# Patient Record
(demographics unavailable — no encounter records)

---

## 2024-10-10 NOTE — IMAGING
[de-identified] : LSPINE Inspection: No rash or ecchymosis Palpation: No tenderness to palpation or spasm in bilateral thoracic and lumbar paraspinal musculature, no SI joint tenderness to palpation ROM: Full with stiffness Strength: 5/5 bilateral hip flexors, knee extensors, ankle dorsiflexors, EHL, ankle plantarflexors Sensation: Sensation present to light touch bilateral L2-S1 distributions Provocative maneuvers: Negative bilateral straight leg raise [No bony abnormalities] : No bony abnormalities [AP] : anteroposterior [There are no fractures, subluxations or dislocations. No significant abnormalities are seen] : There are no fractures, subluxations or dislocations. No significant abnormalities are seen

## 2024-10-10 NOTE — ASSESSMENT
[FreeTextEntry1] : PT, meds  Will discuss MRI  NSAIDs- Patient warned of risk of medication to GI tract, increased blood pressure, cardiac risk, and risk of fluid retention.  Advised to clear medication with internist or PCP if any concurrent health problem with heart, blood pressure, or GI system exists.

## 2024-10-10 NOTE — HISTORY OF PRESENT ILLNESS
[Sharp] : sharp [Intermittent] : intermittent [Meds] : meds [Bending forward] : bending forward [Full time] : Work status: full time [de-identified] : 10/10/2024: Patient is a 30 year y.o. male presenting for evaluation of lower ack/. pt states that he has been having low back pain over a year. recently has increased. denies radiating pain. pt states that he has occasional numbness and tingling in his legs. no prior treatment.  he notes some subjective weakness. No bb dysfunction.    ?smoker, on blood thinner, or diabetic: none  [] : no [FreeTextEntry1] : lower back  [de-identified] : kelly

## 2024-12-10 NOTE — IMAGING
[No bony abnormalities] : No bony abnormalities [AP] : anteroposterior [There are no fractures, subluxations or dislocations. No significant abnormalities are seen] : There are no fractures, subluxations or dislocations. No significant abnormalities are seen [de-identified] : LSPINE Palpation: No tenderness to palpation or spasm in bilateral thoracic and lumbar paraspinal musculature, no SI joint tenderness to palpation ROM: Full with stiffness Strength: 5/5 bilateral hip flexors, knee extensors, ankle dorsiflexors, EHL, ankle plantarflexors Sensation: Sensation present to light touch bilateral L2-S1 distributions Provocative maneuvers: Negative bilateral straight leg raise

## 2024-12-10 NOTE — HISTORY OF PRESENT ILLNESS
[Sharp] : sharp [Intermittent] : intermittent [Meds] : meds [Bending forward] : bending forward [Full time] : Work status: full time [de-identified] : 10/10/2024: Patient is a 30 year y.o. male presenting for evaluation of lower back. pt states that he has been having low back pain over a year. recently has increased. denies radiating pain. pt states that he has occasional numbness and tingling in his legs. no prior treatment.  he notes some subjective weakness. No bb dysfunction.  12/10/24: Pt has been going to PT, states mild improvement. Not taking pain meds.  smoker, on blood thinner, or diabetic: none  [] : no [FreeTextEntry1] : lower back  [de-identified] : kelly

## 2024-12-10 NOTE — ASSESSMENT
[FreeTextEntry1] : Patient has failed 6 weeks of conservative care, including physical therapy and medications. Will obtain MRI to rule out HNP; will also be used to guide potential future injections/surgical management.  NSAIDs- Patient warned of risk of medication to GI tract, increased blood pressure, cardiac risk, and risk of fluid retention.  Advised to clear medication with internist or PCP if any concurrent health problem with heart, blood pressure, or GI system exists.

## 2024-12-20 NOTE — IMAGING
[No bony abnormalities] : No bony abnormalities [AP] : anteroposterior [There are no fractures, subluxations or dislocations. No significant abnormalities are seen] : There are no fractures, subluxations or dislocations. No significant abnormalities are seen [de-identified] : LSPINE Palpation: No tenderness to palpation or spasm in bilateral thoracic and lumbar paraspinal musculature, no SI joint tenderness to palpation ROM: Full with stiffness Strength: 5/5 bilateral hip flexors, knee extensors, ankle dorsiflexors, EHL, ankle plantarflexors Sensation: Sensation present to light touch bilateral L2-S1 distributions Provocative maneuvers: Negative bilateral straight leg raise

## 2024-12-20 NOTE — HISTORY OF PRESENT ILLNESS
[Sharp] : sharp [Intermittent] : intermittent [Meds] : meds [Bending forward] : bending forward [Full time] : Work status: full time [de-identified] : 12/12/2024 Lumbar MRI  - report noted in chart.   Ind. review- LR narrowing L4/5, L5/S1 less so ================================================================================= 10/10/2024: Patient is a 30 year y.o. male presenting for evaluation of lower back. pt states that he has been having low back pain over a year. recently has increased. denies radiating pain. pt states that he has occasional numbness and tingling in his legs. no prior treatment.  he notes some subjective weakness. No bb dysfunction.  12/10/24: Pt has been going to PT, states mild improvement. Not taking pain meds. 12/20/24: Pt here for MRI results.  smoker, on blood thinner, or diabetic: none  [] : no [FreeTextEntry1] : lower back  [de-identified] : kelly

## 2024-12-20 NOTE — ASSESSMENT
[FreeTextEntry1] : LR narrowing L4/5, L5/S1 less so Discussed Pain and surgery options  NSAIDs- Patient warned of risk of medication to GI tract, increased blood pressure, cardiac risk, and risk of fluid retention.  Advised to clear medication with internist or PCP if any concurrent health problem with heart, blood pressure, or GI system exists.

## 2025-05-20 NOTE — HISTORY OF PRESENT ILLNESS
[Sharp] : sharp [Intermittent] : intermittent [Meds] : meds [Bending forward] : bending forward [Full time] : Work status: full time [de-identified] : 12/12/2024 Lumbar MRI  - report noted in chart.  Ind. review- LR narrowing L4/5, L5/S1 less so ================================================================================= 10/10/2024: Patient is a 30 year y.o. male presenting for evaluation of lower back. pt states that he has been having low back pain over a year. recently has increased. denies radiating pain. pt states that he has occasional numbness and tingling in his legs. no prior treatment.  he notes some subjective weakness. No bb dysfunction.  12/10/24: Pt has been going to PT, states mild improvement. Not taking pain meds. 12/20/24: Pt here for MRI results. 5/20/25- sxs same. He is attending PT smoker, on blood thinner, or diabetic: none  [] : no [FreeTextEntry1] : lower back  [de-identified] : kelly

## 2025-05-20 NOTE — IMAGING
[No bony abnormalities] : No bony abnormalities [AP] : anteroposterior [There are no fractures, subluxations or dislocations. No significant abnormalities are seen] : There are no fractures, subluxations or dislocations. No significant abnormalities are seen [de-identified] : LSPINE Palpation: No tenderness to palpation or spasm in bilateral thoracic and lumbar paraspinal musculature, no SI joint tenderness to palpation ROM: Full with stiffness Strength: 5/5 bilateral hip flexors, knee extensors, ankle dorsiflexors, EHL, ankle plantarflexors Sensation: Sensation present to light touch bilateral L2-S1 distributions Provocative maneuvers: Negative bilateral straight leg raise

## 2025-05-20 NOTE — ASSESSMENT
[FreeTextEntry1] : LR narrowing L4/5, L5/S1 less so Pain c/s  NSAIDs- Patient warned of risk of medication to GI tract, increased blood pressure, cardiac risk, and risk of fluid retention.  Advised to clear medication with internist or PCP if any concurrent health problem with heart, blood pressure, or GI system exists.

## 2025-05-23 NOTE — PHYSICAL EXAM
[de-identified] : Constitutional; Appears well, no apparent distress Ability to communicate: Normal  Respiratory: non-labored breathing Skin: No rash noted Head: Normocephalic, atraumatic Neck: no visible thyroid enlargement Eyes: Extraocular movements intact Neurologic: Alert and oriented x3 Psychiatric: normal mood, affect and behavior [Flexion] : flexion [] : motor exam is 5/5 throughout both lower extremities with normal tone

## 2025-05-23 NOTE — PHYSICAL EXAM
[de-identified] : Constitutional; Appears well, no apparent distress Ability to communicate: Normal  Respiratory: non-labored breathing Skin: No rash noted Head: Normocephalic, atraumatic Neck: no visible thyroid enlargement Eyes: Extraocular movements intact Neurologic: Alert and oriented x3 Psychiatric: normal mood, affect and behavior [Flexion] : flexion [] : motor exam is 5/5 throughout both lower extremities with normal tone

## 2025-05-23 NOTE — DISCUSSION/SUMMARY
[de-identified] : After discussing various treatment options with the patient including but not limited to oral medications, physical therapy, exercise modalities as well as interventional spinal injections, we have decided with the following plan:   - Continue Home exercises, stretching, activity modification, physical therapy, and conservative care. - MRI report and/or images was reviewed and discussed with the patient. - Recommend L4-5 Lumbar Epidural Steroid Injection under fluoroscopic guidance with image. - The risks, benefits and alternatives of the proposed procedure were explained in detail with the patient. The risks outlined include but are not limited to infection, bleeding, post-dural puncture headache, nerve injury, a temporary increase in pain, failure to resolve symptoms, allergic reaction, symptom recurrence, and possible elevation of blood sugar in diabetics. All questions were answered to patient's apparent satisfaction and he/she verbalized an understanding. - Patient is presenting with acute/sub-acute radicular pain with impairment in ADLs and functionality.  The pain has not responded to conservative care including NSAID therapy and/or physical therapy.  There is no bleeding tendency, unstable medical condition, or systemic infection. - Follow up in 1-2 weeks post injection for re-evaluation.

## 2025-05-23 NOTE — DISCUSSION/SUMMARY
[de-identified] : After discussing various treatment options with the patient including but not limited to oral medications, physical therapy, exercise modalities as well as interventional spinal injections, we have decided with the following plan:   - Continue Home exercises, stretching, activity modification, physical therapy, and conservative care. - MRI report and/or images was reviewed and discussed with the patient. - Recommend L4-5 Lumbar Epidural Steroid Injection under fluoroscopic guidance with image. - The risks, benefits and alternatives of the proposed procedure were explained in detail with the patient. The risks outlined include but are not limited to infection, bleeding, post-dural puncture headache, nerve injury, a temporary increase in pain, failure to resolve symptoms, allergic reaction, symptom recurrence, and possible elevation of blood sugar in diabetics. All questions were answered to patient's apparent satisfaction and he/she verbalized an understanding. - Patient is presenting with acute/sub-acute radicular pain with impairment in ADLs and functionality.  The pain has not responded to conservative care including NSAID therapy and/or physical therapy.  There is no bleeding tendency, unstable medical condition, or systemic infection. - Follow up in 1-2 weeks post injection for re-evaluation.

## 2025-05-23 NOTE — HISTORY OF PRESENT ILLNESS
[Lower back] : lower back [Left Leg] : left leg [Right Leg] : right leg [9] : 9 [0] : 0 [Dull/Aching] : dull/aching [Radiating] : radiating [Sharp] : sharp [Throbbing] : throbbing [Tightness] : tightness [Tingling] : tingling [Intermittent] : intermittent [Household chores] : household chores [Leisure] : leisure [Sitting] : sitting [Lying in bed] : lying in bed [Physical therapy] : physical therapy [FreeTextEntry1] : Initial HPI 05/23/2025: Pain started over a year ago and is across the lower back described as a sharp pain with numbness and tingling in his feet. Pain is worse with bending forward. Saw Dr. Tierney who recommended pain mgt.   MRI Lumbar Spine 12/12/24 independently reviewed: L4-5 LR stenosis  Conservative Care: has done PT with no relief  Pain Medications: Mobic PRN  Past Injections: none Spine surgery: none  Blood thinners: none [] : no [FreeTextEntry6] : numbness and tingling in bilateral legs  [FreeTextEntry7] : Bilateral legs  [de-identified] : x-ray and MRI

## 2025-05-23 NOTE — HISTORY OF PRESENT ILLNESS
[Lower back] : lower back [Left Leg] : left leg [Right Leg] : right leg [9] : 9 [0] : 0 [Dull/Aching] : dull/aching [Radiating] : radiating [Sharp] : sharp [Throbbing] : throbbing [Tightness] : tightness [Tingling] : tingling [Intermittent] : intermittent [Household chores] : household chores [Leisure] : leisure [Sitting] : sitting [Lying in bed] : lying in bed [Physical therapy] : physical therapy [FreeTextEntry1] : Initial HPI 05/23/2025: Pain started over a year ago and is across the lower back described as a sharp pain with numbness and tingling in his feet. Pain is worse with bending forward. Saw Dr. Tierney who recommended pain mgt.   MRI Lumbar Spine 12/12/24 independently reviewed: L4-5 LR stenosis  Conservative Care: has done PT with no relief  Pain Medications: Mobic PRN  Past Injections: none Spine surgery: none  Blood thinners: none [] : no [FreeTextEntry6] : numbness and tingling in bilateral legs  [FreeTextEntry7] : Bilateral legs  [de-identified] : x-ray and MRI

## 2025-07-11 NOTE — DISCUSSION/SUMMARY
[de-identified] : After discussing various treatment options with the patient including but not limited to oral medications, physical therapy, exercise modalities as well as interventional spinal injections, we have decided with the following plan:  - Continue Home exercises, stretching, activity modification, physical therapy, and conservative care. - MRI report and/or images was reviewed and discussed with the patient. - Recommend B/L L4-5 Transforaminal Epidural Steroid Injection under fluoroscopic guidance with image. - The risks, benefits and alternatives of the proposed procedure were explained in detail with the patient. The risks outlined include but are not limited to infection, bleeding, post-dural puncture headache, nerve injury, a temporary increase in pain, failure to resolve symptoms, allergic reaction, symptom recurrence, and possible elevation of blood sugar in diabetics. All questions were answered to patient's apparent satisfaction and he/she verbalized an understanding. - Patient is presenting with acute/sub-acute radicular pain with impairment in ADLs and functionality.  The pain has not responded to conservative care including NSAID therapy and/or physical therapy.  There is no bleeding tendency, unstable medical condition, or systemic infection. - Follow up in 1-2 weeks post injection for re-evaluation.

## 2025-07-11 NOTE — PHYSICAL EXAM
[Flexion] : flexion [de-identified] : Constitutional; Appears well, no apparent distress Ability to communicate: Normal  Respiratory: non-labored breathing Skin: No rash noted Head: Normocephalic, atraumatic Neck: no visible thyroid enlargement Eyes: Extraocular movements intact Neurologic: Alert and oriented x3 Psychiatric: normal mood, affect and behavior [] : no ecchymosis

## 2025-07-11 NOTE — DISCUSSION/SUMMARY
[de-identified] : After discussing various treatment options with the patient including but not limited to oral medications, physical therapy, exercise modalities as well as interventional spinal injections, we have decided with the following plan:  - Continue Home exercises, stretching, activity modification, physical therapy, and conservative care. - MRI report and/or images was reviewed and discussed with the patient. - Recommend B/L L4-5 Transforaminal Epidural Steroid Injection under fluoroscopic guidance with image. - The risks, benefits and alternatives of the proposed procedure were explained in detail with the patient. The risks outlined include but are not limited to infection, bleeding, post-dural puncture headache, nerve injury, a temporary increase in pain, failure to resolve symptoms, allergic reaction, symptom recurrence, and possible elevation of blood sugar in diabetics. All questions were answered to patient's apparent satisfaction and he/she verbalized an understanding. - Patient is presenting with acute/sub-acute radicular pain with impairment in ADLs and functionality.  The pain has not responded to conservative care including NSAID therapy and/or physical therapy.  There is no bleeding tendency, unstable medical condition, or systemic infection. - Follow up in 1-2 weeks post injection for re-evaluation.

## 2025-07-11 NOTE — PHYSICAL EXAM
[Flexion] : flexion [de-identified] : Constitutional; Appears well, no apparent distress Ability to communicate: Normal  Respiratory: non-labored breathing Skin: No rash noted Head: Normocephalic, atraumatic Neck: no visible thyroid enlargement Eyes: Extraocular movements intact Neurologic: Alert and oriented x3 Psychiatric: normal mood, affect and behavior [] : no ecchymosis

## 2025-07-11 NOTE — HISTORY OF PRESENT ILLNESS
[Lower back] : lower back [Left Leg] : left leg [Right Leg] : right leg [9] : 9 [0] : 0 [Dull/Aching] : dull/aching [Radiating] : radiating [Sharp] : sharp [Throbbing] : throbbing [Tightness] : tightness [Tingling] : tingling [Intermittent] : intermittent [Household chores] : household chores [Leisure] : leisure [Sitting] : sitting [Lying in bed] : lying in bed [Physical therapy] : physical therapy [FreeTextEntry1] : 07/11/2025: s/p L4-5 LESI on 06/17/25 with 50% relief and improvement of ADLs. Numbness and tingling is more intermittent.   Initial HPI 05/23/2025: Pain started over a year ago and is across the lower back described as a sharp pain with numbness and tingling in his feet. Pain is worse with bending forward. Saw Dr. Tierney who recommended pain mgt.   MRI Lumbar Spine 12/12/24 independently reviewed: L4-5 LR stenosis  Conservative Care: has done PT with no relief  Pain Medications: Mobic PRN  Past Injections: none Spine surgery: none  Blood thinners: none  [] : no [FreeTextEntry6] : numbness and tingling in bilateral legs  [FreeTextEntry7] : Bilateral legs  [de-identified] : x-ray and MRI

## 2025-07-11 NOTE — HISTORY OF PRESENT ILLNESS
[Lower back] : lower back [Left Leg] : left leg [Right Leg] : right leg [9] : 9 [0] : 0 [Dull/Aching] : dull/aching [Radiating] : radiating [Sharp] : sharp [Throbbing] : throbbing [Tightness] : tightness [Tingling] : tingling [Intermittent] : intermittent [Household chores] : household chores [Leisure] : leisure [Sitting] : sitting [Lying in bed] : lying in bed [Physical therapy] : physical therapy [FreeTextEntry1] : 07/11/2025: s/p L4-5 LESI on 06/17/25 with 50% relief and improvement of ADLs. Numbness and tingling is more intermittent.   Initial HPI 05/23/2025: Pain started over a year ago and is across the lower back described as a sharp pain with numbness and tingling in his feet. Pain is worse with bending forward. Saw Dr. Tierney who recommended pain mgt.   MRI Lumbar Spine 12/12/24 independently reviewed: L4-5 LR stenosis  Conservative Care: has done PT with no relief  Pain Medications: Mobic PRN  Past Injections: none Spine surgery: none  Blood thinners: none  [] : no [FreeTextEntry6] : numbness and tingling in bilateral legs  [FreeTextEntry7] : Bilateral legs  [de-identified] : x-ray and MRI

## 2025-07-22 NOTE — PROCEDURE
[FreeTextEntry3] : Date of Service: 07/22/2025   Account: [id number]  Patient: NONSO TAJGBU   YOB: 1994  Age: 31 year   Surgeon:                                                 Chau Burton D.O.   Assistant:                                                None.  Pre-Operative Diagnosis:                    Lumbosacral Radiculitis (M54.17)  Post-Operative Diagnosis:                  Same  Procedure:                                             Right L4-5 transforaminal epidural steroid injection                                                                   Left L4-5 transforaminal epidural steroid injection under fluoroscopic guidance.  Anesthesia:                                             MAC with local   This procedure was carried out using fluoroscopic guidance.  The risks and benefits of the procedure were discussed extensively with the patient.  The consent of the patient was obtained and the following procedure was performed. The patient was placed in the prone position on the fluoroscopic table and the lumbar area was prepped and draped in a sterile fashion. A timeout was performed with all essential staff present and the site and side were verified.  The left L4-5 neural foramen was then identified on left oblique "anderson dog" anatomical view at the 6 o'clock position using fluoroscopic guidance, and the area was marked. The overlying skin and subcutaneous structures were anesthetized using sterile technique with 1% Lidocaine.  A 22-gauge spinal needle was directed toward the inferior (6 o'clock) position of the pedicle, which formed the roof of the identified foramen.  Once in the epidural space, after negative aspiration for heme and CSF, 1cc of Omnipaque contrast was injected under live fluoroscopy to confirm epidural location and assess filling defects and rule out intravascular needle placement.   The following epidurogram observed: no intravascular or intrathecal flow pattern was noted.  No blood or CSF was aspirated. Contrast spread medially in epidural space.    After this, an injectate of 1 cc preservative free normal saline plus 6 mg of betamethasone and 1 cc of 0.25% bupivacaine was injected in the epidural space.  The right L4-5 neural foramen was then identified on right oblique "anderson dog" anatomical view at the 6 o'clock position using fluoroscopic guidance, and the area was marked. The overlying skin and subcutaneous structures were anesthetized using sterile technique with 1% Lidocaine.  A 22-gauge spinal needle was directed toward the inferior (6 o'clock) position of the pedicle, which formed the roof of the identified foramen.  Once in the epidural space, after negative aspiration for heme and CSF, 1cc of Omnipaque contrast was injected under live fluoroscopy to confirm epidural location and assess filling defects and rule out intravascular needle placement.   The following epidurogram observed: no intravascular or intrathecal flow pattern was noted.  No blood or CSF was aspirated. Contrast spread medially in epidural space.    After this, an injectate of 1 cc preservative free normal saline plus 6 mg of betamethasone and 1 cc of 0.25% bupivacaine was injected in the epidural space.  The needle was subsequently removed.  Vital signs remained normal.  Pulse oximeter was used throughout the procedure and the patient's pulse and oxygen saturation remained within normal limits. The patient tolerated the procedure well.  There were no complications.  The patient was instructed to apply ice over the injection sites for twenty minutes every two hours for the next 24 to 48 hours.  The patient was also instructed to contact me immediately if there were any problems.  Chau Burton D.O.